# Patient Record
Sex: FEMALE | Race: ASIAN | Employment: FULL TIME | URBAN - METROPOLITAN AREA
[De-identification: names, ages, dates, MRNs, and addresses within clinical notes are randomized per-mention and may not be internally consistent; named-entity substitution may affect disease eponyms.]

---

## 2018-09-19 PROBLEM — Z34.90 PREGNANCY: Status: ACTIVE | Noted: 2018-09-19

## 2018-09-19 PROBLEM — Z78.9 LANGUAGE BARRIER: Status: ACTIVE | Noted: 2018-09-19

## 2019-04-12 RX ORDER — OXYTOCIN/RINGER'S LACTATE 15/250 ML
250 PLASTIC BAG, INJECTION (ML) INTRAVENOUS ONCE
Status: CANCELLED | OUTPATIENT
Start: 2019-04-12 | End: 2019-04-12

## 2019-04-12 RX ORDER — SODIUM CHLORIDE 0.9 % (FLUSH) 0.9 %
5-40 SYRINGE (ML) INJECTION AS NEEDED
Status: CANCELLED | OUTPATIENT
Start: 2019-04-12

## 2019-04-12 RX ORDER — DEXTROSE, SODIUM CHLORIDE, SODIUM LACTATE, POTASSIUM CHLORIDE, AND CALCIUM CHLORIDE 5; .6; .31; .03; .02 G/100ML; G/100ML; G/100ML; G/100ML; G/100ML
125 INJECTION, SOLUTION INTRAVENOUS CONTINUOUS
Status: CANCELLED | OUTPATIENT
Start: 2019-04-12

## 2019-04-12 RX ORDER — SODIUM CHLORIDE 0.9 % (FLUSH) 0.9 %
5-40 SYRINGE (ML) INJECTION EVERY 8 HOURS
Status: CANCELLED | OUTPATIENT
Start: 2019-04-12

## 2019-04-14 ENCOUNTER — ANESTHESIA EVENT (OUTPATIENT)
Dept: LABOR AND DELIVERY | Age: 34
End: 2019-04-14
Payer: COMMERCIAL

## 2019-04-14 ENCOUNTER — HOSPITAL ENCOUNTER (INPATIENT)
Age: 34
LOS: 2 days | Discharge: HOME | End: 2019-04-16
Attending: OBSTETRICS & GYNECOLOGY | Admitting: OBSTETRICS & GYNECOLOGY
Payer: COMMERCIAL

## 2019-04-14 ENCOUNTER — ANESTHESIA (OUTPATIENT)
Dept: LABOR AND DELIVERY | Age: 34
End: 2019-04-14
Payer: COMMERCIAL

## 2019-04-14 DIAGNOSIS — Z3A.39 39 WEEKS GESTATION OF PREGNANCY: Primary | ICD-10-CM

## 2019-04-14 PROBLEM — O46.90 VAGINAL BLEEDING DURING PREGNANCY, ANTEPARTUM: Status: ACTIVE | Noted: 2019-04-14

## 2019-04-14 LAB
ABO + RH BLD: NORMAL
BLOOD GROUP ANTIBODIES SERPL: NORMAL
ERYTHROCYTE [DISTWIDTH] IN BLOOD BY AUTOMATED COUNT: 12.3 % (ref 11.9–14.6)
HCT VFR BLD AUTO: 39.6 % (ref 35.8–46.3)
HGB BLD-MCNC: 13.2 G/DL (ref 11.7–15.4)
MCH RBC QN AUTO: 31.6 PG (ref 26.1–32.9)
MCHC RBC AUTO-ENTMCNC: 33.3 G/DL (ref 31.4–35)
MCV RBC AUTO: 94.7 FL (ref 79.6–97.8)
NRBC # BLD: 0 K/UL (ref 0–0.2)
PLATELET # BLD AUTO: 137 K/UL (ref 150–450)
PMV BLD AUTO: 12.2 FL (ref 9.4–12.3)
RBC # BLD AUTO: 4.18 M/UL (ref 4.05–5.2)
SPECIMEN EXP DATE BLD: NORMAL
WBC # BLD AUTO: 7.2 K/UL (ref 4.3–11.1)

## 2019-04-14 PROCEDURE — 74011250636 HC RX REV CODE- 250/636

## 2019-04-14 PROCEDURE — 59025 FETAL NON-STRESS TEST: CPT

## 2019-04-14 PROCEDURE — 4A1HXCZ MONITORING OF PRODUCTS OF CONCEPTION, CARDIAC RATE, EXTERNAL APPROACH: ICD-10-PCS | Performed by: OBSTETRICS & GYNECOLOGY

## 2019-04-14 PROCEDURE — 75410000000 HC DELIVERY VAGINAL/SINGLE

## 2019-04-14 PROCEDURE — 74011000250 HC RX REV CODE- 250

## 2019-04-14 PROCEDURE — 74011250636 HC RX REV CODE- 250/636: Performed by: OBSTETRICS & GYNECOLOGY

## 2019-04-14 PROCEDURE — 65270000029 HC RM PRIVATE

## 2019-04-14 PROCEDURE — A4300 CATH IMPL VASC ACCESS PORTAL: HCPCS | Performed by: ANESTHESIOLOGY

## 2019-04-14 PROCEDURE — 86900 BLOOD TYPING SEROLOGIC ABO: CPT

## 2019-04-14 PROCEDURE — 36415 COLL VENOUS BLD VENIPUNCTURE: CPT

## 2019-04-14 PROCEDURE — 75410000002 HC LABOR FEE PER 1 HR

## 2019-04-14 PROCEDURE — 75410000003 HC RECOV DEL/VAG/CSECN EA 0.5 HR

## 2019-04-14 PROCEDURE — 10907ZC DRAINAGE OF AMNIOTIC FLUID, THERAPEUTIC FROM PRODUCTS OF CONCEPTION, VIA NATURAL OR ARTIFICIAL OPENING: ICD-10-PCS | Performed by: OBSTETRICS & GYNECOLOGY

## 2019-04-14 PROCEDURE — 77030003028 HC SUT VCRL J&J -A

## 2019-04-14 PROCEDURE — 77030011943

## 2019-04-14 PROCEDURE — 85027 COMPLETE CBC AUTOMATED: CPT

## 2019-04-14 PROCEDURE — 77030014125 HC TY EPDRL BBMI -B: Performed by: ANESTHESIOLOGY

## 2019-04-14 PROCEDURE — 99284 EMERGENCY DEPT VISIT MOD MDM: CPT

## 2019-04-14 RX ORDER — MINERAL OIL
120 OIL (ML) ORAL
Status: DISCONTINUED | OUTPATIENT
Start: 2019-04-14 | End: 2019-04-14 | Stop reason: HOSPADM

## 2019-04-14 RX ORDER — BUTORPHANOL TARTRATE 2 MG/ML
1 INJECTION INTRAMUSCULAR; INTRAVENOUS
Status: DISCONTINUED | OUTPATIENT
Start: 2019-04-14 | End: 2019-04-14 | Stop reason: HOSPADM

## 2019-04-14 RX ORDER — ROPIVACAINE HYDROCHLORIDE 2 MG/ML
INJECTION, SOLUTION EPIDURAL; INFILTRATION; PERINEURAL AS NEEDED
Status: DISCONTINUED | OUTPATIENT
Start: 2019-04-14 | End: 2019-04-14 | Stop reason: HOSPADM

## 2019-04-14 RX ORDER — DIPHENHYDRAMINE HCL 25 MG
25 CAPSULE ORAL
Status: DISCONTINUED | OUTPATIENT
Start: 2019-04-14 | End: 2019-04-16 | Stop reason: HOSPADM

## 2019-04-14 RX ORDER — OXYTOCIN/RINGER'S LACTATE 30/500 ML
0-25 PLASTIC BAG, INJECTION (ML) INTRAVENOUS
Status: DISCONTINUED | OUTPATIENT
Start: 2019-04-14 | End: 2019-04-14 | Stop reason: HOSPADM

## 2019-04-14 RX ORDER — SODIUM CHLORIDE 0.9 % (FLUSH) 0.9 %
5-40 SYRINGE (ML) INJECTION EVERY 8 HOURS
Status: DISCONTINUED | OUTPATIENT
Start: 2019-04-14 | End: 2019-04-14

## 2019-04-14 RX ORDER — DOCUSATE SODIUM 100 MG/1
100 CAPSULE, LIQUID FILLED ORAL 2 TIMES DAILY
Status: DISCONTINUED | OUTPATIENT
Start: 2019-04-14 | End: 2019-04-16 | Stop reason: HOSPADM

## 2019-04-14 RX ORDER — ROPIVACAINE HYDROCHLORIDE 2 MG/ML
INJECTION, SOLUTION EPIDURAL; INFILTRATION; PERINEURAL
Status: DISCONTINUED | OUTPATIENT
Start: 2019-04-14 | End: 2019-04-14 | Stop reason: HOSPADM

## 2019-04-14 RX ORDER — ONDANSETRON 4 MG/1
4 TABLET, ORALLY DISINTEGRATING ORAL
Status: ACTIVE | OUTPATIENT
Start: 2019-04-14 | End: 2019-04-15

## 2019-04-14 RX ORDER — IBUPROFEN 600 MG/1
600 TABLET ORAL
Status: DISCONTINUED | OUTPATIENT
Start: 2019-04-14 | End: 2019-04-16 | Stop reason: HOSPADM

## 2019-04-14 RX ORDER — OXYCODONE AND ACETAMINOPHEN 5; 325 MG/1; MG/1
1 TABLET ORAL
Status: DISCONTINUED | OUTPATIENT
Start: 2019-04-14 | End: 2019-04-16 | Stop reason: HOSPADM

## 2019-04-14 RX ORDER — SIMETHICONE 80 MG
80 TABLET,CHEWABLE ORAL
Status: DISCONTINUED | OUTPATIENT
Start: 2019-04-14 | End: 2019-04-16 | Stop reason: HOSPADM

## 2019-04-14 RX ORDER — LIDOCAINE HYDROCHLORIDE 20 MG/ML
JELLY TOPICAL
Status: DISCONTINUED | OUTPATIENT
Start: 2019-04-14 | End: 2019-04-14 | Stop reason: RX

## 2019-04-14 RX ORDER — SODIUM CHLORIDE 0.9 % (FLUSH) 0.9 %
5-40 SYRINGE (ML) INJECTION AS NEEDED
Status: DISCONTINUED | OUTPATIENT
Start: 2019-04-14 | End: 2019-04-14

## 2019-04-14 RX ORDER — LIDOCAINE HYDROCHLORIDE AND EPINEPHRINE 15; 5 MG/ML; UG/ML
INJECTION, SOLUTION EPIDURAL
Status: DISCONTINUED | OUTPATIENT
Start: 2019-04-14 | End: 2019-04-14 | Stop reason: HOSPADM

## 2019-04-14 RX ORDER — OXYTOCIN/0.9 % SODIUM CHLORIDE 15/250 ML
250 PLASTIC BAG, INJECTION (ML) INTRAVENOUS ONCE
Status: DISCONTINUED | OUTPATIENT
Start: 2019-04-14 | End: 2019-04-14

## 2019-04-14 RX ORDER — DEXTROSE, SODIUM CHLORIDE, SODIUM LACTATE, POTASSIUM CHLORIDE, AND CALCIUM CHLORIDE 5; .6; .31; .03; .02 G/100ML; G/100ML; G/100ML; G/100ML; G/100ML
125 INJECTION, SOLUTION INTRAVENOUS CONTINUOUS
Status: DISCONTINUED | OUTPATIENT
Start: 2019-04-14 | End: 2019-04-14

## 2019-04-14 RX ORDER — LIDOCAINE HYDROCHLORIDE 10 MG/ML
1 INJECTION INFILTRATION; PERINEURAL
Status: DISCONTINUED | OUTPATIENT
Start: 2019-04-14 | End: 2019-04-14 | Stop reason: HOSPADM

## 2019-04-14 RX ADMIN — ROPIVACAINE HYDROCHLORIDE 7 ML/HR: 2 INJECTION, SOLUTION EPIDURAL; INFILTRATION; PERINEURAL at 16:24

## 2019-04-14 RX ADMIN — SODIUM CHLORIDE, SODIUM LACTATE, POTASSIUM CHLORIDE, CALCIUM CHLORIDE, AND DEXTROSE MONOHYDRATE 125 ML/HR: 600; 310; 30; 20; 5 INJECTION, SOLUTION INTRAVENOUS at 14:29

## 2019-04-14 RX ADMIN — OXYTOCIN 4 MILLI-UNITS/MIN: 10 INJECTION, SOLUTION INTRAMUSCULAR; INTRAVENOUS at 14:36

## 2019-04-14 RX ADMIN — LIDOCAINE HYDROCHLORIDE AND EPINEPHRINE 4.5 ML: 15; 5 INJECTION, SOLUTION EPIDURAL at 20:25

## 2019-04-14 RX ADMIN — ROPIVACAINE HYDROCHLORIDE 3 ML: 2 INJECTION, SOLUTION EPIDURAL; INFILTRATION; PERINEURAL at 16:23

## 2019-04-14 RX ADMIN — ROPIVACAINE HYDROCHLORIDE 4 ML: 2 INJECTION, SOLUTION EPIDURAL; INFILTRATION; PERINEURAL at 16:22

## 2019-04-14 NOTE — PROGRESS NOTES
Admission assessment completed. Oriented to room and plan of care. No questions at this time. Maternal blood drawn for cord blood kit. Consents witnessed, iv hepwelled. Pt off monitor for intermittent monitoring, pt bounching on the birthing ball. Reactive tracing prior to removal. replace monitoring at 1430 Pt and  both speak very good english.  services offered. Pt and her  declined offer.

## 2019-04-14 NOTE — PROGRESS NOTES
Pt to [facility] with c/o contractions and vaginal bleeding. Upon further investing, pt describes vaginal bleeding as a spot about this size of a nickel after voiding one time. Pt is scheduled of an induction tomorrow. . Family at bedside. Dr. Elías Madsen coming to see.

## 2019-04-14 NOTE — H&P
. 
   
 
History & Physical 
 
Name: Mary Lemons MRN: [id number]  SSN: [id number] YOB: 1985  Age: 34 y.o. Sex: female Chief Complaint Patient presents with  Contractions 39w2d Subjective:  
 
Estimated Date of Delivery: 19 OB History  Para Term  AB Living 1 SAB TAB Ectopic Molar Multiple Live Births # Outcome Date GA Lbr Phong/2nd Weight Sex Delivery Anes PTL Lv  
1 Current Ms. Paula Zarco is seen with pregnancy at 39w2d for possible labor. She has had some spotting this morning and irregular contractions. She lives 1 hour away. Prenatal course was normal. 
the patients states that the baby moves as usual 
 Please see prenatal records for details. Past Medical History:  
Diagnosis Date  Tachycardia No past surgical history on file. Social History Occupational History  Not on file Tobacco Use  Smoking status: Never Smoker  Smokeless tobacco: Never Used Substance and Sexual Activity  Alcohol use: No  
 Drug use: No  
 Sexual activity: Yes  
  Partners: Male Family History Problem Relation Age of Onset  No Known Problems Mother  Hypertension Father  Hypertension Paternal Grandmother  Breast Cancer Paternal Grandmother  Hypertension Paternal Grandfather No Known Allergies Prior to Admission medications Medication Sig Start Date End Date Taking? Authorizing Provider  
[id number] zita-folic-dha (PRENATAL DHA+COMPLETE PRENATAL) [id number] mg-mcg-mg cmpk Take  by mouth. Provider, Historical  
  
 
Review of Systems: 
Constitutional:No headache, fever Cardiac:   No chest pain Resp: No cough or shortness of breath GI:   No nausea/vomiting, diarrhea, abdominal pain :   No dysuria Neuro:     No vision changes, headache Objective:  
 
Vitals: 
Vitals:  
 19 1100 19 1315 Temp: 98.9 °F (37.2 °C) 98.5 °F (36.9 °C) Physical Exam: 
Patient without distress. Heart: Regular rate and rhythm Lung: clear to auscultation throughout lung fields, no wheezes, no rales, no rhonchi and normal respiratory effort Back: costovertebral angle tenderness absent Abdomen: soft, nontender, without guarding, without rebound Fundus: soft and non tender Cervical Exam: 4 cm dilated 50% effaced +1 station Presenting Part: cephalic Lower Extremities:  - Edema No 
Membranes:  Intact Fetal Heart Rate tracing: Category 1 Uterine contractions: irregular Prenatal Labs:  
Lab Results Component Value Date/Time  
 Rubella, External immune 2018 GrBStrep, External negative 2019 HBsAg, External negative 2018 HIV, External NR 2018 RPR, External NR 2018 Assessment/Plan:  
 
Ms. Shanda Dubose is a  seen with pregnancy at 39w2d for latent phase labor. Plan: Allow to ambulate for 1 hour, then recheck. Signed By:  Marifer Wilde MD   
 2019 Addendum: 
Recheck of cervix is 60/5/+1. Will admit for labor management. Augment with pitocin as contractions are still irregular. Patient discussed with Dr. Sukhi Martinez MD

## 2019-04-14 NOTE — PROGRESS NOTES
sve- 6-7/75/-1 Dr Jerome Chavez updated. Orders to call MD when she is 8-9 if MD has not arrived at hospital yet

## 2019-04-14 NOTE — PROGRESS NOTES
Pt calls out stating now she needs to use the restroom, and she is having painful contractions Assisted pt to the restroom, pt states she is ready for her epidural. lr bolusing now.

## 2019-04-14 NOTE — PROGRESS NOTES
Dr Janee Albrecht here for sve. Pt states contractions this morning with bleeding. Scheduled for an induction in the morning at 39.3 weeks

## 2019-04-14 NOTE — PROGRESS NOTES
1725- late onset variable following contraction. Into pt's room. 1730- pt repositioned to the right side, bloody show noted on perineum and pad In and out cath for 700 mls clear urine Sve: 8-9/c/0 with bbow Dr Akanksha Cason updated to sve. Will be headed to the hospital now.

## 2019-04-14 NOTE — L&D DELIVERY NOTE
Delivery Summary    Patient: Fuentes Otto MRN: [id number]  SSN: [id number]    YOB: 1985  Age: 34 y.o. Sex: female       Information for the patient's :  Winsome Godinez [[id number]]       Labor Events:    Labor: No    Steroids: None   Cervical Ripening Date/Time:       Cervical Ripening Type: None   Antibiotics During Labor: No   Rupture Identifier:      Rupture Date/Time: 2019 6:30 PM   Rupture Type: AROM   Amniotic Fluid Volume:      Amniotic Fluid Description: Clear    Amniotic Fluid Odor:      Induction: None       Induction Date/Time:        Indications for Induction:      Augmentation: Oxytocin   Augmentation Date/Time:      Indications for Augmentation: Ineffective Contraction Pattern   Labor complications: None       Additional complications:        Delivery Events:  Indications For Episiotomy:     Episiotomy:     Perineal Laceration(s):     Repaired:     Periurethral Laceration Location:      Repaired:     Labial Laceration Location:     Repaired:     Sulcal Laceration Location:     Repaired:     Vaginal Laceration Location:     Repaired:     Cervical Laceration Location:     Repaired:     Repair Suture:     Number of Repair Packets:     Estimated Blood Loss (ml):  ml     Delivery Date: 2019    Delivery Time: 6:58 PM  Delivery Type: Vaginal, Spontaneous  Sex:  Female    Gestational Age: 44w2d   Delivery Clinician: Rodrigo Easley  Living Status: Living   Delivery Location: L&D 434          APGARS  One minute Five minutes Ten minutes   Skin color: 1   1        Heart rate: 2   2        Grimace: 2   2        Muscle tone: 2   2        Breathin   2        Totals: 9   9            Presentation: Vertex    Position: Left Occiput Anterior  Resuscitation Method:  Suctioning-bulb; Tactile Stimulation     Meconium Stained: None      Cord Information: 3 Vessels  Complications: Nuchal Cord Without Compressions  Cord around: head  Delayed cord clamping?  Yes  Cord clamped date/time:   Disposition of Cord Blood: Lab    Blood Gases Sent?: No    Placenta:  Date/Time: 2019  7:03 PM  Removal: Spontaneous      Appearance: Normal;Intact; Other (comment)      Measurements:  Birth Weight: 5 lb 6.8 oz (2.46 kg)      Birth Length: 1' 7.5\" (0.495 m)      Head Circumference: 1' 0.8\" (0.325 m)      Chest Circumference: 11.61\" (0.295 m)     Abdominal Girth: Other Providers:   VALERIANO DORSEY OB;SRIRAM SWAIN;LESLIE JAQUEZ;TYLER CAST;TRYONE WATSON;SHAWNEE HILL;GULSHAN ALVAREZ;SELWYN LEON, Obstetrician;Primary Nurse;Scrub Tech; Anesthesiologist;Crna;Staff Nurse;Charge Nurse;Scrub Tech           Group B Strep:   Lab Results   Component Value Date/Time    GrBStrep, External negative 2019     Information for the patient's :  Aleshia Half [[id number]]   No results found for: ABORH, PCTABR, PCTDIG, BILI, ABORHEXT, ABORH    No results for input(s): PCO2CB, PO2CB, HCO3I, SO2I, IBD, PTEMPI, SPECTI, PHICB, ISITE, IDEV, IALLEN in the last 72 hours.

## 2019-04-14 NOTE — PROGRESS NOTES
1855: MD at bedside 1857: Head delivered 185: Delivery of viable baby girl. APGAR: 9/9. Weight 5lb 7oz. 1903: placenta delivered

## 2019-04-15 PROCEDURE — 74011250637 HC RX REV CODE- 250/637: Performed by: OBSTETRICS & GYNECOLOGY

## 2019-04-15 PROCEDURE — 65270000029 HC RM PRIVATE

## 2019-04-15 RX ADMIN — WITCH HAZEL 1 PAD: 500 SOLUTION RECTAL; TOPICAL at 01:57

## 2019-04-15 RX ADMIN — DOCUSATE SODIUM 100 MG: 100 CAPSULE, LIQUID FILLED ORAL at 01:56

## 2019-04-15 RX ADMIN — WITCH HAZEL 1 PAD: 500 SOLUTION RECTAL; TOPICAL at 20:56

## 2019-04-15 RX ADMIN — DOCUSATE SODIUM 100 MG: 100 CAPSULE, LIQUID FILLED ORAL at 08:37

## 2019-04-15 RX ADMIN — IBUPROFEN 600 MG: 600 TABLET, FILM COATED ORAL at 14:44

## 2019-04-15 RX ADMIN — IBUPROFEN 600 MG: 600 TABLET, FILM COATED ORAL at 01:56

## 2019-04-15 RX ADMIN — IBUPROFEN 600 MG: 600 TABLET, FILM COATED ORAL at 08:37

## 2019-04-15 RX ADMIN — DOCUSATE SODIUM 100 MG: 100 CAPSULE, LIQUID FILLED ORAL at 17:35

## 2019-04-15 RX ADMIN — IBUPROFEN 600 MG: 600 TABLET, FILM COATED ORAL at 20:44

## 2019-04-15 NOTE — ANESTHESIA PROCEDURE NOTES
Epidural Block    Performed by: Jelly Diop MD  Authorized by: Jelly Diop MD     Pre-Procedure  Indication: labor epidural    Preanesthetic Checklist: patient identified, risks and benefits discussed, anesthesia consent, patient being monitored, timeout performed and anesthesia consent    Timeout Time: 16:13        Epidural:   Patient position:  Seated  Prep region:  Lumbar  Prep: Betadine    Location:  L3-4    Needle and Epidural Catheter:   Needle Type:  Tuohy  Needle Gauge:  17 G  Injection Technique:  Loss of resistance using air  Attempts:  1  Catheter Size:  19 G  Events: no blood with aspiration, no cerebrospinal fluid with aspiration, no paresthesia and negative aspiration test    Test Dose:  Negative    Assessment:   Catheter Secured:  Tape  Insertion:  Uncomplicated  Patient tolerance:  Patient tolerated the procedure well with no immediate complications

## 2019-04-15 NOTE — PROGRESS NOTES
SBAR IN Report: Mother Verbal report received from MARYAM Bergman. (full name & credentials) on this patient, who is now being transferred from  and D (unit) for routine progression of care. The patient is not wearing a green \"Anesthesia-Duramorph\" band. Report consisted of patient's Situation, Background, Assessment and Recommendations (SBAR). [place] ID bands were compared with the identification form, and verified with the patient and transferring nurse. Information from the SBAR and the [place] Report was reviewed with the transferring nurse; opportunity for questions and clarification provided.

## 2019-04-15 NOTE — LACTATION NOTE
In to see mom and infant for the first time. Mom stated that infant is latching and nursing well but that it is painful. Reviewed positioning and latching with mom. Also reviewed the expectations of the first 24 hours as well as the second night. Mom to call our when infant is awake and cueing.

## 2019-04-15 NOTE — PROGRESS NOTES
Chart reviewed - no needs identified.  met with family and provided education/pamphlet on [employer] Postpartum [place] [facility] Visit. Family would like to receive home visit. Referral will be made at discharge. No PCP - list of PCPs provided.  provided informational packet on  mood disorder education/resources. Family receptive to receiving information and denied any additional needs from . EPDS provided in [facility]. Family has this 's contact information should any needs/questions arise. Lisa Brennan, [address] [facility]

## 2019-04-15 NOTE — PROGRESS NOTES
Post-Delivery Day Number 1/2 Progress Note Patient doing well post-delivery day 1/2 from vaginal delivery without significant complaints. Pain controlled on current medication. Tolerating diet. Ambulating/Voiding without difficulty, normal lochia. Vitals:  Blood pressure 125/71, pulse 79, temperature 98.4 °F (36.9 °C), resp. rate 17, last menstrual period 07/13/2018, SpO2 96 %, unknown if currently breastfeeding. Vital signs stable, afebrile. Exam:  Patient without distress. Abdomen soft, fundus firm at level of umbilicus, nontender. Lower extremities are negative for swelling, cords or tenderness. Lochia- moderate Labs: No lab exists for component: HBG Assessment and Plan:  Patient appears to be having uncomplicated post-vaginal delivery course. Continue routine post-op care and maternal education.

## 2019-04-15 NOTE — ROUTINE PROCESS
SBAR OUT Report: Mother Verbal report given to Zoe Cho RN (full name & credentials) on this patient, who is now being transferred to [place] (unit) for routine progression of care. The patient is not wearing a green \"Anesthesia-Duramorph\" band. Report consisted of patient's Situation, Background, Assessment and Recommendations (SBAR). [place] ID bands were compared with the identification form, and verified with the patient and receiving nurse. Information from the SBAR and the [facility] Report was reviewed with the receiving nurse; opportunity for questions and clarification provided. Fundus and bleeding checked with receiving nurse.

## 2019-04-15 NOTE — LACTATION NOTE

## 2019-04-15 NOTE — ANESTHESIA PREPROCEDURE EVALUATION
Relevant Problems No relevant active problems Anesthetic History Review of Systems / Medical History Patient summary reviewed and pertinent labs reviewed Pulmonary Neuro/Psych Cardiovascular Exercise tolerance: >4 METS 
  
GI/Hepatic/Renal 
  
 
 
 
 
 
 Endo/Other Other Findings Physical Exam 
 
Airway Mallampati: I 
TM Distance: > 6 cm Neck ROM: normal range of motion Mouth opening: Normal 
 
 Cardiovascular Rhythm: regular Rate: normal 
 
 
 
 Dental 
No notable dental hx Pulmonary Breath sounds clear to auscultation Abdominal 
 
 
 
 Other Findings Anesthetic Plan ASA: 1 Anesthesia type: epidural 
 
 
 
 
 
Anesthetic plan and risks discussed with: Patient

## 2019-04-15 NOTE — LACTATION NOTE
Called back to room to assist with a feeding. Infant was latched on mom's left breast in the football hold when I entered the room. Noted that infant was not on deep enough and mom stated that it was uncomfortable. Offered to assist mom to get a deeper latch. Mom broke the latch and brought infant off the breast. Instructed mom to position infant nipple to nose and the hold the inside of her left breast to wedge it up. Infant latched and started sucking rhythmically. She nursed for 15 minutes and fell asleep. Mom then brought infant off her breast and I taught her how to burp infant. We then placed infant to mom's right breast in the football hold and infant latched and took a suck or two and fell asleep. Lactation consultant will follow up tomorrow.

## 2019-04-15 NOTE — PROGRESS NOTES
Assessment complete per Doc Flowsheet. WNL. Motrin given po for pain 2/10 per patient request.  Scheduled Colace given po.

## 2019-04-16 VITALS
HEART RATE: 73 BPM | DIASTOLIC BLOOD PRESSURE: 61 MMHG | TEMPERATURE: 98 F | SYSTOLIC BLOOD PRESSURE: 107 MMHG | RESPIRATION RATE: 18 BRPM | OXYGEN SATURATION: 97 %

## 2019-04-16 PROCEDURE — 74011250637 HC RX REV CODE- 250/637: Performed by: OBSTETRICS & GYNECOLOGY

## 2019-04-16 RX ORDER — OXYCODONE AND ACETAMINOPHEN 5; 325 MG/1; MG/1
1 TABLET ORAL
Qty: 12 TAB | Refills: 0 | Status: SHIPPED | OUTPATIENT
Start: 2019-04-16 | End: 2019-04-19

## 2019-04-16 RX ORDER — IBUPROFEN 600 MG/1
600 TABLET ORAL
Qty: 40 TAB | Refills: 1 | Status: SHIPPED | OUTPATIENT
Start: 2019-04-16 | End: 2019-04-30

## 2019-04-16 RX ORDER — DOCUSATE SODIUM 100 MG/1
100 CAPSULE, LIQUID FILLED ORAL 2 TIMES DAILY
Qty: 60 CAP | Refills: 2 | Status: SHIPPED | OUTPATIENT
Start: 2019-04-16 | End: 2019-04-30

## 2019-04-16 RX ADMIN — IBUPROFEN 600 MG: 600 TABLET, FILM COATED ORAL at 04:02

## 2019-04-16 RX ADMIN — IBUPROFEN 600 MG: 600 TABLET, FILM COATED ORAL at 09:11

## 2019-04-16 RX ADMIN — DOCUSATE SODIUM 100 MG: 100 CAPSULE, LIQUID FILLED ORAL at 09:11

## 2019-04-16 NOTE — LACTATION NOTE
This note was copied from a baby's chart. Mother pumped and obtained 3 ml of colostrum. Mother educated how to feed colostrum back to infant using curve tip syringe. After feeding colostrum to infant, mother and father choose to top infant off with formula (similac). Mother to call if needing assitance.

## 2019-04-16 NOTE — LACTATION NOTE
Mom and baby are going home today. Continue to offer the breast without restriction. Mom's milk should be fully in over the next few days. Reviewed engorgement precautions. Hand Expression has been demoed and written hand-out reviewed. As milk comes in baby will be more alert at the breast and swallows will be more obvious. Breasts may feel softer once baby has finished nursing. Baby should be back to birth weight by 3weeks of age. And then gain on average 1 oz per day for the next 2-3 months. Reviewed babies should be exclusively breastfeeding for the first 6 months and that breastfeeding should continue after introduction of appropriate complimentary foods after 6 months. Initial output should be at least 1 wet and 1 bowel movement for each day old baby is. By day 5-7 once milk is fully in baby will consistently have 6 or more soaking wet diapers and about 4 bowel movement. Some babies have a bowel movement with every feeding and some have 1-3 large bowel movements each day. Inadequate output may indicate inadequate feedings and should be reported to your Pediatrician. Bowel habits may change as baby gets older. Encouraged follow-up at Pediatrician in 1-2 days, no later than 1 week of age. Call [facility] for any questions as needed or to set up an OP visit. OP phone calls are returned within 24 hours. Community Breastfeeding Resource List given.

## 2019-04-16 NOTE — LACTATION NOTE
In to see mom and infant for discharge. Mom states overall infant is latching and feeding well. She did chose to supplement x 1 time overnight in middle of cluster feeding to help calm baby. Reviewed discharge teaching and importance of feeding baby 8-12 full feeds per day, monitor output. Be proactive w/ baby is sleeping too long or sleepy feeding at breast, to wake and if not doing at least a good, active 15-20 minute feed on one breast, to use personal pump and give back any expressed colostrum. Encouraged close f/u w/ pediatrician this week. Reviewed how to manage period of engorgement. Dad and mom using nipple to give back supplementation whether pumped or formula. Mom has pumped 2x since birth. One time got 3 ml and this am got 7mls. Reviewed that is good for normal pumping volumes first 1-2 days of life. Reviewed pumping tips and breast milk storage rules. Offered outpatient lactation follow up but declined at this time. Has number if needs it in future.

## 2019-04-16 NOTE — DISCHARGE INSTRUCTIONS
Patient Education        Vaginal Childbirth: Care Instructions  Your Care Instructions    Your body will slowly heal in the next few weeks. It is easy to get too tired and overwhelmed during the first weeks after your baby is born. Changes in your hormones can shift your mood without warning. You may find it hard to meet the extra demands on your energy and time. Take it easy on yourself. Follow-up care is a key part of your treatment and safety. Be sure to make and go to all appointments, and call your doctor if you are having problems. It's also a good idea to know your test results and keep a list of the medicines you take. How can you care for yourself at home? · Vaginal bleeding and cramps  ? After delivery, you will have a bloody discharge from the vagina. This will turn pink within a week and then white or yellow after about 10 days. It may last for 2 to 4 weeks or longer, until the uterus has healed. Use pads instead of tampons until you stop bleeding. ? Do not worry if you pass some blood clots, as long as they are smaller than a golf ball. If you have a tear or stitches in your vaginal area, change the pad at least every 4 hours to prevent soreness and infection. ? You may have cramps for the first few days after childbirth. These are normal and occur as the uterus shrinks to normal size. Take an over-the-counter pain medicine, such as acetaminophen (Tylenol), ibuprofen (Advil, Motrin), or naproxen (Aleve), for cramps. Read and follow all instructions on the label. Do not take aspirin, because it can cause more bleeding. ? Do not take two or more pain medicines at the same time unless the doctor told you to. Many pain medicines have acetaminophen, which is Tylenol. Too much acetaminophen (Tylenol) can be harmful. · Stitches  ? If you have stitches, they will dissolve on their own and do not need to be removed. Follow your doctor's instructions for cleaning the stitched area.   ? Put ice or a cold pack on your painful area for 10 to 20 minutes at a time, several times a day, for the first few days. Put a thin cloth between the ice and your skin. ? Sit in a few inches of warm water (sitz bath) 3 times a day and after bowel movements. The warm water helps with pain and itching. If you do not have a tub, a warm shower might help. · Breast fullness  ? Your breasts may overfill (engorge) in the first few days after delivery. To help milk flow and to relieve pain, warm your breasts in the shower or by using warm, moist towels before nursing. ? If you are not nursing, do not put warmth on your breasts or touch your breasts. Wear a tight bra or sports bra and use ice until the fullness goes away. This usually takes 2 to 3 days. ? Put ice or a cold pack on your breast after nursing to reduce swelling and pain. Put a thin cloth between the ice and your skin. · Activity  ? Eat a balanced diet. Do not try to lose weight by cutting calories. Keep taking your prenatal vitamins, or take a multivitamin. ? Get as much rest as you can. Try to take naps when your baby sleeps during the day. ? Get some exercise every day. But do not do any heavy exercise until your doctor says it is okay. ? Wait until you are healed (about 4 to 6 weeks) before you have sexual intercourse. Your doctor will tell you when it is okay to have sex. ? Talk to your doctor about birth control. You can get pregnant even before your period returns. Also, you can get pregnant while you are breastfeeding. · Mental health  ? It is normal to have some sadness, anxiety, sleeplessness, and mood swings after you go home. If you feel upset or hopeless for more than a few days or are having trouble doing the things you need to do, talk to your doctor. · Constipation and hemorrhoids  ? Drink plenty of fluids, enough so that your urine is light yellow or clear like water.  If you have kidney, heart, or liver disease and have to limit fluids, talk with your doctor before you increase the amount of fluids you drink. ? Eat plenty of fiber each day. Have a bran muffin or bran cereal for breakfast, and try eating a piece of fruit for a mid-afternoon snack. ? For painful, itchy hemorrhoids, put ice or a cold pack on the area several times a day for 10 minutes at a time. Follow this by putting a warm compress on the area for another 10 to 20 minutes or by sitting in a shallow, warm bath. When should you call for help? Call 911 anytime you think you may need emergency care. For example, call if:    · You passed out (lost consciousness).    Call your doctor now or seek immediate medical care if:    · You have severe vaginal bleeding.     · You are dizzy or lightheaded, or you feel like you may faint.     · You have a fever.     · You have new or more pain in your belly or pelvis.    Watch closely for changes in your health, and be sure to contact your doctor if:    · Your vaginal bleeding seems to be getting heavier.     · You have new or worse vaginal discharge.     · You feel sad, anxious, or hopeless for more than a few days.     · You do not get better as expected. Where can you learn more? Go to http://rajiv-dakota.info/. Enter U343 in the search box to learn more about \"Vaginal Childbirth: Care Instructions. \"  Current as of: September 5, 2018  Content Version: 11.9  © 3077-0562 [employer]. Care instructions adapted under license by [employer] (which disclaims liability or warranty for this information). If you have questions about a medical condition or this instruction, always ask your healthcare professional. [place] [address] any warranty or liability for your use of this information.

## 2019-04-16 NOTE — DISCHARGE SUMMARY
[facility] [address] [place] [address] Discharge Summary     Patient ID:  Marianna Spain  [id number]  34 y.o.  1985    Admit date: 4/14/2019    Discharge date and time: No discharge date for patient encounter. Admitting Physician: Miguel Munoz MD     Discharge Physician: Lara Pham M.D. Admission Diagnoses: Vaginal bleeding during pregnancy, antepartum [O46.90]    Problem List: Hospital - Principal Problem:    Vaginal bleeding during pregnancy, antepartum (4/14/2019)     ; Other -   Patient Active Problem List   Diagnosis Code    Pregnancy Z34.90    Language barrier Z78.9    Vaginal bleeding during pregnancy, antepartum O46.90        Discharge Diagnoses: Vaginal bleeding during pregnancy, antepartum [O46.90]    Hospital Course: Marianna Spain had unremarkeable progressive recovery. and Eating, ambulating, and voiding in a routine manner. Significant Diagnostic Studies: No results found for this or any previous visit (from the past 24 hour(s)). Procedures: spontaneous vaginal delivery    Discharge Exam:  Visit Vitals  /61 (BP 1 Location: Left arm, BP Patient Position: At rest)   Pulse 73   Temp 98 °F (36.7 °C)   Resp 18   LMP 07/13/2018   SpO2 97%   Breastfeeding? Unknown        Heart: regular rate and rhythm, S1, S2 normal, no murmur, click, rub or gallop  Lungs:clear to auscultation bilaterally  Extremities: normal, atraumatic, no cyanosis or edema. No DVT  Incision/episiotomy: no significant drainage, no dehiscence, no significant erythema    Patient Instructions:   Current Discharge Medication List      START taking these medications    Details   docusate sodium (COLACE) 100 mg capsule Take 1 Cap by mouth two (2) times a day for 90 days. Qty: 60 Cap, Refills: 2      ibuprofen (MOTRIN) 600 mg tablet Take 1 Tab by mouth every six (6) hours as needed for Pain.  Indications: Pain  Qty: 40 Tab, Refills: 1      oxyCODONE-acetaminophen (PERCOCET) 5-325 mg per tablet Take 1 Tab by mouth every four (4) hours as needed for Pain for up to 3 days. Max Daily Amount: 6 Tabs. Indications: Pain  Qty: 12 Tab, Refills: 0    Associated Diagnoses: 39 weeks gestation of pregnancy         CONTINUE these medications which have NOT CHANGED    Details   [id number] zita-folic-dha (PRENATAL DHA+COMPLETE PRENATAL) -300 mg-mcg-mg cmpk Take  by mouth. Activity: physical activity is restricted per discharge instructions  Diet: resume normal diet  Wound Care: Keep wound clean and dry, as directed    Follow-up with Deirdre in 2 weeks.     Signed:  Veto Storm MD  4/16/2019  9:46 AM

## 2019-04-16 NOTE — LACTATION NOTE
This note was copied from a baby's chart. This RN called in as infant is continuously acting hungry. Reviewed 2nd night. Mother requested formula. Offered to first assist mother in pumping and feed back to infant. Mother agrees and verbalizes that depending on how much she gets she would like to top off with formula. Infant has not had a void since 1135.

## 2019-04-16 NOTE — PROGRESS NOTES
Assessment complete per Doc Flowsheet. WNL. Motrin given po for pain 1/10 per patient request.  Scheduled Colace given po.

## 2019-04-30 PROBLEM — O46.90 VAGINAL BLEEDING DURING PREGNANCY, ANTEPARTUM: Status: RESOLVED | Noted: 2019-04-14 | Resolved: 2019-04-30
